# Patient Record
(demographics unavailable — no encounter records)

---

## 2017-09-05 NOTE — PCM.SN
- Free Text/Narrative


Note: 





Dr. Batista dictating addendum note as the supervising physician on this case. 

I personally seen and evaluated this patient and agree with the findings as 

documented. I have seen the clear cerebrospinal fluid dripping from the 

incision. The child looks very nontoxic and is interactive and age-appropriate. 

We will proceed to place a saline lock and a maintenance fluids as the patient 

is nothing by mouth because after discussion with the neurosurgeon at [facility] she will likely be going directly to surgery. We will draw a CBC CMP and 

give a dose of Rocephin 500 mg, 50 mg/kg. The mother is aware of these plans 

and is in agreement and flight team has been notified. Our understanding from 

the neurosurgeon and our conversation with her is that the patient will be 

going directly to surgery this evening for this CSF leak.





Impression: Postoperative problems as complication with CSF fluid leak status 

post intracranial surgery

## 2017-09-05 NOTE — EDM.PDOC
ED HPI GENERAL MEDICAL PROBLEM





- General


Source of Information: Reports: Family


History Limitations: Reports: No Limitations





- General


Chief Complaint: Head Injury


Stated Complaint: LEAKING SPINCAL FLOID


Time Seen by Provider: 09/05/17 18:45





- History of Present Illness


INITIAL COMMENTS - FREE TEXT/NARRATIVE: 


History of present illness:


1-year-old child brought by mother with concerns of clear fluid draining from a 

recent surgical incision site.





Review of systems: 


As per history of present illness and below otherwise all systems reviewed and 

negative.





Past medical history: 


As per history of present illness and as reviewed below otherwise 

noncontributory.





Surgical history: 


As per history of present illness and as reviewed below otherwise 

noncontributory.





Social history: 


No reported history of drug or alcohol abuse.





Family history: 


As per history of present illness and as reviewed below otherwise 

noncontributory.





Physical exam:


HEENT: Atraumatic, normocephalic, pupils reactive, negative for conjunctival 

pallor or scleral icterus, mucous membranes moist, throat clear, neck supple, 

nontender, trachea midline.


Lungs: Clear to auscultation, breath sounds equal bilaterally, chest nontender.


Heart: S1S2, regular, negative for clicks, rubs, or JVD.


Abdomen: Soft, nondistended, nontender. Negative for masses or 

hepatosplenomegaly. Negative for costovertebral tenderness.


Pelvis: Stable nontender.


Genitourinary: Deferred.


Rectal: Deferred.


Extremities: Atraumatic, negative for cords or calf pain. Neurovascular 

unremarkable.


Neuro: Awake, alert, oriented. Cranial nerves II through XII unremarkable. 

Cerebellum unremarkable. Motor and sensory unremarkable throughout. Exam 

nonfocal.








2 obvious well healing suture lines on the front of the scalp and one on the 

left medial aspect of the scalp. Clear fluid intermittently draining from the 

front site where the suture line is pale. Mother indicated this started slowly 

this morning and she called but then it stopped and now it has restarted and 

has picked up with speed as well as quantity.





Fluid is suspicious for CSF and mother indicates that the medial suture had 

been the drain site and it had had leakage and sutures had to be placed on 

drain was pulled.





Child is nontoxic, interactive and is without any fever and or nuchal rigidity.








Spoke with patient's neurosurgeon Dr. Toure at [facility] she requested patient be 

placed nothing by mouth for surgical intervention upon arrival. Conference call 

with Dr. Toure as well as the ER physician Dr. Cutler for transfer.





Decision made to fly patient due to distance and time constraints











Diagnostics:


[CBC, CMP]





Therapeutics:


[Rocephin 500 mg IV, normal saline KVO]





Impression: 


[CSF leak]





Plan:


[Transfer to patients neurosurgeon in [facility]]





Definitive disposition and diagnosis as appropriate pending reevaluation and 

review of above.





 (Greg Lynne)





Please add to the history of present illness that the surgery that was 

performed was for an arachnoid cyst per the mother (Judith Batista)





- Related Data


 Allergies











Allergy/AdvReac Type Severity Reaction Status Date / Time


 


No Known Allergies Allergy   Verified 09/05/17 18:23











Home Meds: 


 Home Meds





. [No Known Home Meds]  09/05/17 [History]











Past Medical History





- Past Health History


Medical/Surgical History: Denies Medical/Surgical History


Neurological History: Reports: Brain Injury, Other (See Below)


Other Neuro History: surgery for cyst removed





Social & Family History





- Tobacco Use


Smoking Status *Q: Never Smoker


Second Hand Smoke Exposure: No





- Caffeine Use


Caffeine Use: Reports: None





- Recreational Drug Use


Recreational Drug Use: No





ED ROS GENERAL





- Review of Systems


Review Of Systems: See Below (See history of present illness)





ED EXAM, HEAD INJURY





- Physical Exam


Exam: See Below (History of present illness)





- Vital Signs


Last Recorded V/S: 





 Last Vital Signs











Temp  36.2 C   09/05/17 18:29


 


Pulse  107   09/05/17 21:25


 


Resp  27   09/05/17 21:25


 


BP      


 


Pulse Ox  100   09/05/17 21:25














- Orders/Labs/Meds


Orders: 





 Active Orders 24 hr











 Category Date Time Status


 


 Saline Lock Insert [OM.PC] Stat Oth  09/05/17 18:56 Ordered











Labs: 





 Laboratory Tests











  09/05/17 09/05/17 09/05/17 Range/Units





  18:28 19:09 19:09 


 


WBC   12.66   (4.0-13.5)  K/uL


 


RBC   4.26   (3.90-5.30)  M/uL


 


Hgb   12.1   (9.0-17.0)  g/dL


 


Hct   32.9   (27.0-51.0)  %


 


MCV   77.2   (68.0-87.0)  fL


 


MCH   28.4   (24.0-36.0)  pg


 


MCHC   36.8   (28.0-37.0)  g/dL


 


RDW Std Deviation   35.9   (28.0-62.0)  fl


 


RDW Coeff of Shawn   13   (11.0-15.0)  %


 


Plt Count   458 H   (150-400)  K/uL


 


MPV   8.90   (7.40-12.00)  fL


 


Add Manual Diff   YES   


 


Neutrophils % (Manual)   25 L   (48.0-80.0)  %


 


Lymphocytes % (Manual)   66 H   (16.0-40.0)  %


 


Monocytes % (Manual)   4   (0.0-15.0)  %


 


Eosinophils % (Manual)   1   (0.0-7.0)  %


 


Basophils % (Manual)   4 H   (0.0-1.5)  %


 


Nucleated RBC %   0.0   /100WBC


 


Absolute Seg Neuts   3.2   


 


Lymphocytes # (Manual)   8.4   


 


Monocytes # (Manual)   0.5   


 


Eosinophils # (Manual)   0.1   


 


Basophils # (Manual)   1   


 


Nucleated RBCs #   0   K/uL


 


Sodium    139  (136-146)  mmol/L


 


Potassium    4.7  (3.5-5.1)  mmol/L


 


Chloride    109  ()  mmol/L


 


Carbon Dioxide    19 L  (21-31)  mmol/L


 


BUN    23  (6.0-23.0)  mg/dL


 


Creatinine    0.5 L  (0.6-1.5)  mg/dL


 


Est Cr Clr Drug Dosing    TNP  


 


Estimated GFR (MDRD)    TNP  


 


Glucose    95  ()  mg/dL


 


POC Glucose  75    (40-80)  mg/dL


 


Calcium    10.1  (8.7-11.0)  mg/dL


 


Total Bilirubin    0.2  (0.1-1.5)  mg/dL


 


AST    27  (5-40)  IU/L


 


ALT    15  (8-54)  IU/L


 


Alkaline Phosphatase    311  ()  


 


Total Protein    7.0  (5.6-7.5)  g/dL


 


Albumin    4.5  (3.8-5.4)  g/dL


 


Globulin    2.5  (2.0-3.5)  g/dL


 


Albumin/Globulin Ratio    1.8  (1.3-2.8)  











Meds: 





Medications














Discontinued Medications














Generic Name Dose Route Start Last Admin





  Trade Name Freq  PRN Reason Stop Dose Admin


 


Ceftriaxone Sodium 500 mg/  50 mls @ 100 mls/hr  09/05/17 18:56  09/05/17 19:21





  Sodium Chloride  IV  09/05/17 19:25  Not Given





  ONETIME ONE   


 


Ceftriaxone Sodium/Dextrose 1  50 mls @ 100 mls/hr  09/05/17 19:14  09/05/17 19:

20





  gm/ Premix  IV  09/05/17 19:43  100 mls/hr





  ONETIME ONE   Administration


 


Sodium Chloride  500 mls @ 10 mls/hr  09/05/17 19:30  





  Normal Saline  IV   





  .BOLUS SOPHIA   


 


Sodium Chloride  500 mls @ 70 mls/hr  09/05/17 19:30  09/05/17 19:19





  Normal Saline  IV   70 mls/hr





  .BOLUS SOPHIA   Administration


 


Sodium Chloride  10 ml  09/05/17 18:56  





  Saline Flush  FLUSH   





  ASDIRECTED PRN   





  Keep Vein Open   


 


Sodium Chloride  2.5 ml  09/05/17 18:56  





  Saline Flush  FLUSH   





  ASDIRECTED PRN   





  Keep Vein Open   














Departure





- Departure


Time of Disposition: 19:08


Condition: Good





- Departure


Disposition: DC/Tfer to [facility]


Clinical Impression: 


 CSF leak








- Discharge Information


Referrals: 


PCP,None [Primary Care Provider] - 


Forms:  ED Department Discharge





- My Orders


Last 24 Hours: 





My Active Orders





09/05/17 18:56


Saline Lock Insert [OM.PC] Stat 














- Assessment/Plan


Last 24 Hours: 





My Active Orders





09/05/17 18:56


Saline Lock Insert [OM.PC] Stat